# Patient Record
Sex: FEMALE | Race: WHITE | NOT HISPANIC OR LATINO | Employment: UNEMPLOYED | URBAN - METROPOLITAN AREA
[De-identification: names, ages, dates, MRNs, and addresses within clinical notes are randomized per-mention and may not be internally consistent; named-entity substitution may affect disease eponyms.]

---

## 2025-01-23 ENCOUNTER — APPOINTMENT (OUTPATIENT)
Dept: OPHTHALMOLOGY | Age: 65
End: 2025-01-23
Payer: COMMERCIAL

## 2025-02-11 ENCOUNTER — OFFICE VISIT (OUTPATIENT)
Dept: OPHTHALMOLOGY | Facility: CLINIC | Age: 65
End: 2025-02-11
Payer: COMMERCIAL

## 2025-02-11 DIAGNOSIS — H52.4 HYPEROPIA OF BOTH EYES WITH ASTIGMATISM AND PRESBYOPIA: ICD-10-CM

## 2025-02-11 DIAGNOSIS — H10.13 ALLERGIC CONJUNCTIVITIS OF BOTH EYES: ICD-10-CM

## 2025-02-11 DIAGNOSIS — H02.889 MEIBOMIAN GLAND DISEASE, UNSPECIFIED LATERALITY: ICD-10-CM

## 2025-02-11 DIAGNOSIS — H04.123 DRY EYES, BILATERAL: Primary | ICD-10-CM

## 2025-02-11 DIAGNOSIS — H52.03 HYPEROPIA OF BOTH EYES WITH ASTIGMATISM AND PRESBYOPIA: ICD-10-CM

## 2025-02-11 DIAGNOSIS — H52.203 HYPEROPIA OF BOTH EYES WITH ASTIGMATISM AND PRESBYOPIA: ICD-10-CM

## 2025-02-11 PROCEDURE — 92004 COMPRE OPH EXAM NEW PT 1/>: CPT | Performed by: OPTOMETRIST

## 2025-02-11 PROCEDURE — 92015 DETERMINE REFRACTIVE STATE: CPT | Performed by: OPTOMETRIST

## 2025-02-11 RX ORDER — FAMOTIDINE 40 MG/1
40 TABLET, FILM COATED ORAL
COMMUNITY
Start: 2024-08-23

## 2025-02-11 RX ORDER — BETAMETHASONE DIPROPIONATE 0.5 MG/G
OINTMENT TOPICAL
COMMUNITY
Start: 2024-09-10

## 2025-02-11 RX ORDER — ALBUTEROL SULFATE 90 UG/1
2 INHALANT RESPIRATORY (INHALATION) EVERY 4 HOURS PRN
COMMUNITY
Start: 2024-12-23

## 2025-02-11 RX ORDER — MELOXICAM 7.5 MG/1
1 TABLET ORAL
COMMUNITY
Start: 2024-08-23

## 2025-02-11 RX ORDER — PEDIATRIC MULTIVITAMIN NO.238
1 TABLET,CHEWABLE ORAL
COMMUNITY

## 2025-02-11 RX ORDER — ALPRAZOLAM 0.25 MG/1
0.25 TABLET ORAL
COMMUNITY
Start: 2024-07-01

## 2025-02-11 RX ORDER — LOTEPREDNOL ETABONATE 5 MG/G
OINTMENT OPHTHALMIC
COMMUNITY
Start: 2014-07-15

## 2025-02-11 ASSESSMENT — CUP TO DISC RATIO
OS_RATIO: 0.5
OD_RATIO: 0.4

## 2025-02-11 ASSESSMENT — ENCOUNTER SYMPTOMS
RESPIRATORY NEGATIVE: 0
PSYCHIATRIC NEGATIVE: 0
NEUROLOGICAL NEGATIVE: 0
CARDIOVASCULAR NEGATIVE: 0
CONSTITUTIONAL NEGATIVE: 0
GASTROINTESTINAL NEGATIVE: 0
ALLERGIC/IMMUNOLOGIC NEGATIVE: 0
HEMATOLOGIC/LYMPHATIC NEGATIVE: 0
MUSCULOSKELETAL NEGATIVE: 0
ENDOCRINE NEGATIVE: 0
EYES NEGATIVE: 1

## 2025-02-11 ASSESSMENT — CONF VISUAL FIELD
OD_INFERIOR_TEMPORAL_RESTRICTION: 0
OD_INFERIOR_NASAL_RESTRICTION: 0
OS_INFERIOR_NASAL_RESTRICTION: 0
OS_INFERIOR_TEMPORAL_RESTRICTION: 0
OS_NORMAL: 1
OS_SUPERIOR_NASAL_RESTRICTION: 0
OD_SUPERIOR_TEMPORAL_RESTRICTION: 0
OD_NORMAL: 1
OD_SUPERIOR_NASAL_RESTRICTION: 0
OS_SUPERIOR_TEMPORAL_RESTRICTION: 0
METHOD: COUNTING FINGERS

## 2025-02-11 ASSESSMENT — REFRACTION_WEARINGRX
OD_AXIS: 034
OD_SPHERE: +1.50
OS_SPHERE: +1.75
OD_CYLINDER: -0.50
OD_ADD: +2.50
OS_ADD: +2.50
OS_AXIS: 124
OS_CYLINDER: -0.50

## 2025-02-11 ASSESSMENT — REFRACTION_MANIFEST
OS_AXIS: 125
OD_AXIS: 035
OS_CYLINDER: -0.50
OD_CYLINDER: -0.50
OD_ADD: +2.50
OS_ADD: +2.50
OS_SPHERE: +2.25
OD_SPHERE: +1.50

## 2025-02-11 ASSESSMENT — EXTERNAL EXAM - LEFT EYE: OS_EXAM: NORMAL

## 2025-02-11 ASSESSMENT — VISUAL ACUITY
OD_CC: J1+
METHOD: SNELLEN - LINEAR
OD_CC: 20/20
OS_CC: J1+
CORRECTION_TYPE: GLASSES
OS_CC: 20/25-2

## 2025-02-11 ASSESSMENT — TONOMETRY
IOP_METHOD: GOLDMANN APPLANATION
OS_IOP_MMHG: 18
OD_IOP_MMHG: 19

## 2025-02-11 ASSESSMENT — SLIT LAMP EXAM - LIDS
COMMENTS: MILD MGD
COMMENTS: MILD MGD

## 2025-02-11 ASSESSMENT — EXTERNAL EXAM - RIGHT EYE: OD_EXAM: NORMAL

## 2025-02-11 NOTE — PROGRESS NOTES
Ocular allergies. Uses lotemax javier PRN. Puts it on the caruncle and it really helps.     Hx of dry eyes and used Z pack for sinus infection and this helped the eyes. Has used XIIDRA and WC without relief. Currently is doing well. No drop whatsoever.     Blepharitis: Meibomain gland disease (Effron scale 0-4, 0:no abnormality, 4: thick creamy yellow expression at all gland orifices, expression continuous, conjunctival redness). Collarettes (0-4, 0: 0-2 lashes with collarettes, 1: 3-10, 2: >10 but <1/3rd of lashes, 3: >1/3rd to <2/3rd of lashes, 4: >2/3rd of lashes.  Right eye (OD): stage 1 collarettes: stage unable to assess due to mascara   Left eye (OS): stage 1 collarettes: stage unable to assess due to mascara     RTC 1 year, manifest refraction (MR) DFE dry eye testing.

## 2025-02-14 ENCOUNTER — APPOINTMENT (OUTPATIENT)
Dept: OPHTHALMOLOGY | Facility: CLINIC | Age: 65
End: 2025-02-14
Payer: COMMERCIAL

## 2025-04-08 ENCOUNTER — TELEPHONE (OUTPATIENT)
Dept: OPHTHALMOLOGY | Facility: HOSPITAL | Age: 65
End: 2025-04-08
Payer: COMMERCIAL

## 2025-04-08 NOTE — TELEPHONE ENCOUNTER
----- Message from Jeaneth FORBES sent at 4/7/2025  9:15 AM EDT -----  Regarding: RE: MEDICATION  Spoke with patient to keep her updated.  ----- Message -----  From: sIaak Perez  Sent: 4/4/2025  11:00 AM EDT  To: Julio Candelaria OD; Jeaneth Templeton  Subject: RE: MEDICATION                                   Hello,   Unfortunately [facility] denied the prior authorization, due to a previous denial on file. The appeal has been imitated with a medical necessary letter explaining formularies caused allergic reactions. I will contact [facility] Monday 04/7 to check status.   Thanks  ----- Message -----  From: Jeaneth Templeton  Sent: 4/2/2025   8:16 AM EDT  To: Isaak Perez; Julio Candelaria, KARLEE  Subject: FW: MEDICATION                                   Spoke with patient to keep her updated on the status.  ----- Message -----  From: Isaak Perez  Sent: 4/1/2025   4:25 PM EDT  To: Julio Candelaria OD; Jeaneth Templeton  Subject: RE: MEDICATION                                   Hello!  The prior auth is still pending with ins.   Thanks  ----- Message -----  From: Julio Candelaria OD  Sent: 3/27/2025   4:47 PM EDT  To: Isaak Perez; Jeaneth Templeton  Subject: RE: MEDICATION                                   Thanks Isaak Eric, can you do a prior authorization with the statement that the patient can only use loteprednol ointment as she has an allergic reaction to all other products tried?  Tobradex javier  Neomycin polymyxin dexamethasone javier.  Thanks.  ----- Message -----  From: Jeaneth Templeton  Sent: 3/27/2025   3:49 PM EDT  To: Julio Candelaria OD  Subject: MEDICATION                                           Good afternoon,    Patient called in regards to the medication that you prescribed lotemax ointment and stated the insurance won't cover it. Patient would like someone to call [facility] pharmacy ([phone]) to let them know medication is the only thing she can use without an allergic reaction. Isaak has  been added was unsure if maybe a prior could be done for patient. Please advise.    Thank you,    Jeaneth MALONEY

## 2025-06-13 ENCOUNTER — APPOINTMENT (OUTPATIENT)
Dept: OPHTHALMOLOGY | Facility: CLINIC | Age: 65
End: 2025-06-13
Payer: COMMERCIAL

## 2025-06-13 DIAGNOSIS — H52.03 HYPEROPIA OF BOTH EYES WITH ASTIGMATISM AND PRESBYOPIA: Primary | ICD-10-CM

## 2025-06-13 DIAGNOSIS — H52.203 HYPEROPIA OF BOTH EYES WITH ASTIGMATISM AND PRESBYOPIA: Primary | ICD-10-CM

## 2025-06-13 DIAGNOSIS — H52.4 HYPEROPIA OF BOTH EYES WITH ASTIGMATISM AND PRESBYOPIA: Primary | ICD-10-CM

## 2025-06-13 RX ORDER — MUPIROCIN 20 MG/G
OINTMENT TOPICAL 3 TIMES DAILY
COMMUNITY
Start: 2025-04-08

## 2025-06-13 ASSESSMENT — REFRACTION_MANIFEST
OS_ADD: +2.00
OD_SPHERE: +2.25
OS_AXIS: 105
OS_SPHERE: +2.75
OD_CYLINDER: -0.50
OS_CYLINDER: -0.50
OD_ADD: +2.00
OD_AXIS: 040

## 2025-06-13 ASSESSMENT — VISUAL ACUITY
OS_CC: 20/20
CORRECTION_TYPE: GLASSES
OD_CC: 20/20
OD_CC+: -1
METHOD: SNELLEN - LINEAR

## 2025-06-13 ASSESSMENT — ENCOUNTER SYMPTOMS
NEUROLOGICAL NEGATIVE: 0
CARDIOVASCULAR NEGATIVE: 0
RESPIRATORY NEGATIVE: 0
ALLERGIC/IMMUNOLOGIC NEGATIVE: 0
PSYCHIATRIC NEGATIVE: 0
ENDOCRINE NEGATIVE: 0
GASTROINTESTINAL NEGATIVE: 0
EYES NEGATIVE: 1
MUSCULOSKELETAL NEGATIVE: 0
CONSTITUTIONAL NEGATIVE: 0
HEMATOLOGIC/LYMPHATIC NEGATIVE: 0

## 2025-06-13 ASSESSMENT — CUP TO DISC RATIO
OD_RATIO: 0.4
OS_RATIO: 0.5

## 2025-06-13 ASSESSMENT — SLIT LAMP EXAM - LIDS
COMMENTS: MILD MGD
COMMENTS: MILD MGD

## 2025-06-13 ASSESSMENT — REFRACTION_WEARINGRX
SPECS_TYPE: PAL
OD_ADD: +2.50
OS_ADD: +2.50
OD_CYLINDER: -0.50
OD_AXIS: 035
OD_SPHERE: +1.50
OS_SPHERE: +2.25
OS_CYLINDER: -0.50
OS_AXIS: 125

## 2025-06-13 ASSESSMENT — EXTERNAL EXAM - RIGHT EYE: OD_EXAM: NORMAL

## 2025-06-13 ASSESSMENT — EXTERNAL EXAM - LEFT EYE: OS_EXAM: NORMAL

## 2025-06-13 NOTE — PROGRESS NOTES
Ocular allergies. Uses lotemax javier PRN. Puts it on the caruncle and it really helps.     Hx of dry eyes and used Z pack for sinus infection and this helped the eyes. Has used XIIDRA and WC without relief. Currently is doing well. No drop whatsoever.     Blepharitis: Meibomain gland disease (Effron scale 0-4, 0:no abnormality, 4: thick creamy yellow expression at all gland orifices, expression continuous, conjunctival redness). Collarettes (0-4, 0: 0-2 lashes with collarettes, 1: 3-10, 2: >10 but <1/3rd of lashes, 3: >1/3rd to <2/3rd of lashes, 4: >2/3rd of lashes.  Right eye (OD): stage 1 collarettes: stage unable to assess due to mascara   Left eye (OS): stage 1 collarettes: stage unable to assess due to mascara     Returned with reduced near OU and OD more blurred at near. May be an adjustment issue PD seg ehight, may be an add problem. Changed Rx and increased add. Revoiewed findings and solution is to get lenses remade.     RTC 1 year, manifest refraction (MR) DFE dry eye testing.

## 2026-02-19 ENCOUNTER — APPOINTMENT (OUTPATIENT)
Dept: OPHTHALMOLOGY | Age: 66
End: 2026-02-19
Payer: COMMERCIAL